# Patient Record
Sex: FEMALE | Race: ASIAN | URBAN - METROPOLITAN AREA
[De-identification: names, ages, dates, MRNs, and addresses within clinical notes are randomized per-mention and may not be internally consistent; named-entity substitution may affect disease eponyms.]

---

## 2018-03-06 ENCOUNTER — THERAPY VISIT (OUTPATIENT)
Dept: PHYSICAL THERAPY | Facility: CLINIC | Age: 28
End: 2018-03-06
Payer: COMMERCIAL

## 2018-03-06 DIAGNOSIS — M25.512 SHOULDER PAIN, LEFT: Primary | ICD-10-CM

## 2018-03-06 PROCEDURE — 97161 PT EVAL LOW COMPLEX 20 MIN: CPT | Mod: GP | Performed by: PHYSICAL THERAPIST

## 2018-03-06 PROCEDURE — 97110 THERAPEUTIC EXERCISES: CPT | Mod: GP | Performed by: PHYSICAL THERAPIST

## 2018-03-06 NOTE — LETTER
[facility] PHYSICAL THERAPY  [address]  [facility] [address]  [phone]    2018    Re: Ashlie Montoya   :   1990  MRN:  [id number]   REFERRING PHYSICIAN:   Elaina Mack    [facility] PHYSICAL THERAPY  Date of Initial Evaluation:  3/6/18  Visits:  Rxs Used: 1  Reason for Referral:  Shoulder pain, left    EVALUATION SUMMARY    [place] [facility] Initial Evaluation  Subjective:  Patient is a 27 year old female presenting with rehab left shoulder hpi. The history is provided by the patient. The history is limited by a language barrier. A  was used.     Ashlie Montoya is a 27 year old female with a left shoulder condition.  Condition occurred with:  Unknown cause.  Condition occurred: for unknown reasons.  This is a new condition  Early 2017 (about 4 weeks).      Patient reports pain:  Anterior and in the joint.  Radiates to:  Cervical and shoulder (up into the neck).  Pain is described as aching (A deep aching) and is intermittent (Occurs with movement) and reported as 2/10.  Associated with: Some clicking and popping, particularly with IR. Pain is the same all the time.  Symptoms are exacerbated by using arm behind back, using arm overhead and using arm at shoulder level and relieved by rest and analgesics.  Since onset symptoms are gradually improving.  Special tests:  X-ray (Patient reports that her x-ray report came back normal).  Previous treatment: N/A.  Improvement with previous treatment: N/A.      General health as reported by patient is good.  Pertinent medical history includes:  None (None reported by patient).  Medical allergies: no (None reported by patient).  Other surgeries include:  None reported.  Current medications:  None as reported by the patient.  Current occupation is .  Patient is working in normal job without restrictions.  Primary job tasks include:  Prolonged sitting and  repetitive tasks.    Barriers include:  None as reported by the patient.  Red flags:  None as reported by the patient.                  Objective:    Shoulder Evaluation:  ROM:  AROM:    Flexion:  Left:  113    Right:  167  Extension: Left: 43Right: 54  Abduction:  Left: 71   Right:  159  Internal Rotation:  Left:  L1    Right:  T9  External Rotation:  Left:  85    Right:  76    Pain: Pain with AROM Flexion and ABD on L side    Strength:    Flexion: Left:3+/5    Pain: +    Right: 4+/5     Pain:   Extension:  Left: 4/5    Pain:    Right: 5/5    Pain:  Abduction:  Left: 3+/5   Pain:+    Right: 4+/5     Pain:    Internal Rotation:  Left:4+/5     Pain:    Right: 5/5     Pain:  External Rotation:   Left:4/5     Pain:   Right:4+/5     Pain:      Assessment/Plan:    Patient is a 27 year old female with left side shoulder complaints.    Patient has the following significant findings with corresponding treatment plan.                  Diagnosis 1:  L Shoulder  Pain -  hot/cold therapy, electric stimulation, manual therapy, self management, education and directional preference exercise  Decreased ROM/flexibility - manual therapy and therapeutic exercise  Decreased strength - therapeutic exercise and therapeutic activities  Impaired muscle performance - neuro re-education  Decreased function - therapeutic activities    Therapy Evaluation Codes:   1) History comprised of:   Personal factors that impact the plan of care:      Language.    Comorbidity factors that impact the plan of care are:      None.     Medications impacting care: None.  2) Examination of Body Systems comprised of:   Body structures and functions that impact the plan of care:      Shoulder.   Activity limitations that impact the plan of care are:      None.  3) Clinical presentation characteristics are:   Stable/Uncomplicated.  4) Decision-Making    Low complexity using standardized patient assessment instrument and/or measureable assessment of functional  outcome.  Cumulative Therapy Evaluation is: Low complexity.    Previous and current functional limitations:  (See Goal Flow Sheet for this information)    Short term and Long term goals: (See Goal Flow Sheet for this information)     Communication ability:  Patient appears to be able to clearly communicate and understand verbal and written communication and follow directions correctly.    Treatment Explanation - The following has been discussed with the patient:   RX ordered/plan of care  Anticipated outcomes  Possible risks and side effects    This patient would benefit from PT intervention to resume normal activities.   Rehab potential is good.  Frequency:  1 X week, once daily  Duration:  for 8 weeks  Discharge Plan:  Achieve all LTG.  Independent in home treatment program.  Reach maximal therapeutic benefit.    Thank you for your referral.    INQUIRIES  Therapist: Yaneli Galloway DPT, Cert. MDT  [facility] PHYSICAL THERAPY  [address]  [facility] [address]  Phone: [phone]  Fax: [phone]

## 2018-03-06 NOTE — MR AVS SNAPSHOT
"              After Visit Summary   3/6/2018    Ashlie Montoya    MRN: [id number]           Patient Information     Date Of Birth          1990        Visit Information        Provider Department      3/6/2018 1:55 PM Yaneli Galloway, PT; [employer] [facility]        Today's Diagnoses     Shoulder pain, left    -  1       Follow-ups after your visit        Your next 10 appointments already scheduled     Mar 13, 2018  2:50 PM CDT   DIANE Extremity with Rosalba Landaverdeo, PTA   [facility] ([facility]  )    [address]  [facility] [id number] [phone]            Mar 20, 2018  2:50 PM CDT   DIANE Extremity with Rosalba Lynn, PTA   [facility] ([facility]  )    [address]  [facility] [id number] [phone]              Who to contact     If you have questions or need follow up information about today's clinic visit or your schedule please contact [facility] PHYSICAL THERAPY directly at [phone].  Normal or non-critical lab and imaging results will be communicated to you by MyChart, letter or phone within 4 business days after the clinic has received the results. If you do not hear from us within 7 days, please contact the clinic through [employer] or phone. If you have a critical or abnormal lab result, we will notify you by phone as soon as possible.  Submit refill requests through [employer] or call your pharmacy and they will forward the refill request to us. Please allow 3 business days for your refill to be completed.          Additional Information About Your Visit        MyChart Information     [employer] lets you send messages to your doctor, view your test results, renew your prescriptions, schedule appointments and more. To sign up, go to [email or web address] . Click on \"Log in\" on the left side of the screen, which will take you to the " "Welcome page. Then click on \"Sign up Now\" on the right side of the page.     You will be asked to enter the access code listed below, as well as some personal information. Please follow the directions to create your username and password.     Your access code is: [email or web address]  Expires: 2018  3:26 PM     Your access code will  in 90 days. If you need help or a new code, please call your [place] clinic or [phone].        Care EveryWhere ID     This is your Care EveryWhere ID. This could be used by other organizations to access your [place] medical records  [id number]         Blood Pressure from Last 3 Encounters:   No data found for BP    Weight from Last 3 Encounters:   No data found for Wt              We Performed the Following     HC PT EVAL, LOW COMPLEXITY     DIANE INITIAL EVAL REPORT     THERAPEUTIC EXERCISES        Primary Care Provider Fax #    Physician No Ref-Primary [phone]       No address on file        Equal Access to Services     FRANK PAYNE : Hadii tiffani ku hadasho Soomaali, waaxda luqadaha, qaybta kaalmada adeegyada, waxay octavioin hayveronica hobson . So [facility] [phone].    ATENCIÓN: Si habla español, tiene a alejo disposición servicios gratuitos de asistencia lingüística. Llame al [phone].    We comply with applicable federal civil rights laws and [place] laws. We do not discriminate on the basis of race, color, national origin, age, disability, sex, sexual orientation, or gender identity.            Thank you!     Thank you for choosing [facility] PHYSICAL THERAPY  for your care. Our goal is always to provide you with excellent care. Hearing back from our patients is one way we can continue to improve our services. Please take a few minutes to complete the written survey that you may receive in the mail after your visit with us. Thank you!             Your Updated Medication List - Protect others around you: Learn how to safely use, store and " throw away your medicines at www.disposemymeds.org.      Notice  As of 3/6/2018  3:26 PM    You have not been prescribed any medications.

## 2018-03-06 NOTE — PROGRESS NOTES
[place] [facility] Initial Evaluation  Subjective:  Patient is a 27 year old female presenting with rehab left shoulder hpi. The history is provided by the patient. The history is limited by a language barrier. A  was used.   Ashlie Montoya is a 27 year old female with a left shoulder condition.  Condition occurred with:  Unknown cause.  Condition occurred: for unknown reasons.  This is a new condition  Early February 2017 (about 4 weeks).    Patient reports pain:  Anterior and in the joint.  Radiates to:  Cervical and shoulder (up into the neck).  Pain is described as aching (A deep aching) and is intermittent (Occurs with movement) and reported as 2/10.  Associated with: Some clicking and popping, particularly with IR. Pain is the same all the time.  Symptoms are exacerbated by using arm behind back, using arm overhead and using arm at shoulder level and relieved by rest and analgesics.  Since onset symptoms are gradually improving.  Special tests:  X-ray (Patient reports that her x-ray report came back normal).  Previous treatment: N/A.  Improvement with previous treatment: N/A.  General health as reported by patient is good.  Pertinent medical history includes:  None (None reported by patient).  Medical allergies: no (None reported by patient).  Other surgeries include:  None reported.  Current medications:  None as reported by the patient.  Current occupation is .  Patient is working in normal job without restrictions.  Primary job tasks include:  Prolonged sitting and repetitive tasks.    Barriers include:  None as reported by the patient.    Red flags:  None as reported by the patient.                        Objective:  System                   Shoulder Evaluation:  ROM:  AROM:    Flexion:  Left:  113    Right:  167  Extension: Left: 43Right: 54  Abduction:  Left: 71   Right:  159    Internal Rotation:  Left:  L1    Right:  T9  External Rotation:  Left:  85     Right:  76                  Pain: Pain with AROM Flexion and ABD on L side    Strength:    Flexion: Left:3+/5    Pain: +    Right: 4+/5     Pain:   Extension:  Left: 4/5    Pain:    Right: 5/5    Pain:  Abduction:  Left: 3+/5   Pain:+    Right: 4+/5     Pain:    Internal Rotation:  Left:4+/5     Pain:    Right: 5/5     Pain:  External Rotation:   Left:4/5     Pain:   Right:4+/5     Pain:                                                     General     ROS    Assessment/Plan:    Patient is a 27 year old female with left side shoulder complaints.    Patient has the following significant findings with corresponding treatment plan.                Diagnosis 1:  L Shoulder  Pain -  hot/cold therapy, electric stimulation, manual therapy, self management, education and directional preference exercise  Decreased ROM/flexibility - manual therapy and therapeutic exercise  Decreased strength - therapeutic exercise and therapeutic activities  Impaired muscle performance - neuro re-education  Decreased function - therapeutic activities    Therapy Evaluation Codes:   1) History comprised of:   Personal factors that impact the plan of care:      Language.    Comorbidity factors that impact the plan of care are:      None.     Medications impacting care: None.  2) Examination of Body Systems comprised of:   Body structures and functions that impact the plan of care:      Shoulder.   Activity limitations that impact the plan of care are:      None.  3) Clinical presentation characteristics are:   Stable/Uncomplicated.  4) Decision-Making    Low complexity using standardized patient assessment instrument and/or measureable assessment of functional outcome.  Cumulative Therapy Evaluation is: Low complexity.    Previous and current functional limitations:  (See Goal Flow Sheet for this information)    Short term and Long term goals: (See Goal Flow Sheet for this information)     Communication ability:  Patient appears to be able to  clearly communicate and understand verbal and written communication and follow directions correctly.  Treatment Explanation - The following has been discussed with the patient:   RX ordered/plan of care  Anticipated outcomes  Possible risks and side effects  This patient would benefit from PT intervention to resume normal activities.   Rehab potential is good.    Frequency:  1 X week, once daily  Duration:  for 8 weeks  Discharge Plan:  Achieve all LTG.  Independent in home treatment program.  Reach maximal therapeutic benefit.    Please refer to the daily flowsheet for treatment today, total treatment time and time spent performing 1:1 timed codes.

## 2018-03-13 ENCOUNTER — THERAPY VISIT (OUTPATIENT)
Dept: PHYSICAL THERAPY | Facility: CLINIC | Age: 28
End: 2018-03-13
Payer: COMMERCIAL

## 2018-03-13 DIAGNOSIS — M25.512 SHOULDER PAIN, LEFT: ICD-10-CM

## 2018-03-13 PROCEDURE — 97112 NEUROMUSCULAR REEDUCATION: CPT | Mod: GP | Performed by: PHYSICAL THERAPY ASSISTANT

## 2018-03-13 PROCEDURE — 97110 THERAPEUTIC EXERCISES: CPT | Mod: GP | Performed by: PHYSICAL THERAPY ASSISTANT

## 2018-03-20 ENCOUNTER — THERAPY VISIT (OUTPATIENT)
Dept: PHYSICAL THERAPY | Facility: CLINIC | Age: 28
End: 2018-03-20
Payer: COMMERCIAL

## 2018-03-20 DIAGNOSIS — M25.512 SHOULDER PAIN, LEFT: ICD-10-CM

## 2018-03-20 PROCEDURE — 97110 THERAPEUTIC EXERCISES: CPT | Mod: GP | Performed by: PHYSICAL THERAPY ASSISTANT

## 2018-03-28 ENCOUNTER — THERAPY VISIT (OUTPATIENT)
Dept: PHYSICAL THERAPY | Facility: CLINIC | Age: 28
End: 2018-03-28
Payer: COMMERCIAL

## 2018-03-28 DIAGNOSIS — M25.512 SHOULDER PAIN, LEFT: ICD-10-CM

## 2018-03-28 PROCEDURE — 97110 THERAPEUTIC EXERCISES: CPT | Mod: GP | Performed by: PHYSICAL THERAPY ASSISTANT

## 2018-04-04 ENCOUNTER — THERAPY VISIT (OUTPATIENT)
Dept: PHYSICAL THERAPY | Facility: CLINIC | Age: 28
End: 2018-04-04
Payer: COMMERCIAL

## 2018-04-04 DIAGNOSIS — M25.512 SHOULDER PAIN, LEFT: ICD-10-CM

## 2018-04-04 PROCEDURE — 97110 THERAPEUTIC EXERCISES: CPT | Mod: GP | Performed by: PHYSICAL THERAPY ASSISTANT

## 2018-04-24 ENCOUNTER — THERAPY VISIT (OUTPATIENT)
Dept: PHYSICAL THERAPY | Facility: CLINIC | Age: 28
End: 2018-04-24
Payer: COMMERCIAL

## 2018-04-24 DIAGNOSIS — M25.512 SHOULDER PAIN, LEFT: ICD-10-CM

## 2018-04-24 PROCEDURE — 97110 THERAPEUTIC EXERCISES: CPT | Mod: GP | Performed by: PHYSICAL THERAPY ASSISTANT

## 2018-04-24 NOTE — PROGRESS NOTES
Subjective:  HPI                    Objective:  System    Physical Exam    General     ROS    Assessment/Plan:    PROGRESS  REPORT    Progress reporting period is from 3/6/18 to 4/24/18.       SUBJECTIVE  Subjective changes noted by patient:  Pt reports shoulder is at least 90% better since starting PT. Most difficult remains with abuuction ROM. No issues at work or school. Continuing to work on neck exercises daily and shoulder strengthening eveyr other day.      Current Pain level: 0/10.     Initial Pain level: 3/10.   Changes in function:  Yes (See Goal flowsheet attached for changes in current functional level)  Adverse reaction to treatment or activity: None    OBJECTIVE  Changes noted in objective findings:  Yes, Flexion has improved 55 degrees, abduction has improved 84 degrees, ER has improved 7 degrees, Ext has improved 8 degrees and IR has improved from L1 to T 9/10; Strength has improved from all motions in regards to MMT.     Objective: L shoulder: AROM Flex 168; Abd 155; ER 92; Ext 51; IR T9/10 (better then R); MMT Flex: 4-/5; Abd: 4/5; IR 4-/5; ER 4+/5; Ext 4/5; Spadi has imrpvoed from 42% to 5%     ASSESSMENT/PLAN  Updated problem list and treatment plan: Diagnosis 1:  L shoulder pain  Pain -  self management, education and home program  Decreased ROM/flexibility - therapeutic exercise and home program  Decreased strength - therapeutic exercise and home program  STG/LTGs have been met or progress has been made towards goals:  Yes (See Goal flow sheet completed today.)  Assessment of Progress: The patient's condition is improving.  The patient's condition has potential to improve.  Patient has met short term goals and is progressing towards long term goals.  Self Management Plans:  Patient is independent in a home treatment program.  Patient is independent in self management of symptoms.  I have discussed this patient with primary PT and find that the nature, scope, duration and intensity of the  therapy is appropriate for the medical condition of the patient.  Ashlie continues to require the following intervention to meet STG and LTG's:  PT intervention is no longer required to meet STG/LTG.    Recommendations:  Patient is going to try things on her own at this time. If problems arise will contact PT and/or MD within the next 6 weeks. If we do not hear from pt will formally d/c at that time.     The progress note/discharge summary was written in collaboration with and reviewed by the physical therapist.    Please refer to the daily flowsheet for treatment today, total treatment time and time spent performing 1:1 timed codes.

## 2018-04-24 NOTE — LETTER
[facility] PHYSICAL THERAPY  [address]  [facility] [address]  [phone]    2018    Re: Ashlie Montoya   :   1990  MRN:  [id number]   REFERRING PHYSICIAN:   Elaina Mack    [facility] PHYSICAL THERAPY  Date of Initial Evaluation: 18  Visits:  Rxs Used: 6  Reason for Referral:  Shoulder pain, left     PROGRESS  REPORT  Progress reporting period is from 3/6/18 to 18.       SUBJECTIVE  Subjective changes noted by patient:  Pt reports shoulder is at least 90% better since starting PT. Most difficult remains with abuuction ROM. No issues at work or school. Continuing to work on neck exercises daily and shoulder strengthening eveyr other day.      Current Pain level: 0/10.     Initial Pain level: 3/10.   Changes in function:  Yes (See Goal flowsheet attached for changes in current functional level)  Adverse reaction to treatment or activity: None    OBJECTIVE  Changes noted in objective findings:  Yes, Flexion has improved 55 degrees, abduction has improved 84 degrees, ER has improved 7 degrees, Ext has improved 8 degrees and IR has improved from L1 to T 9/10; Strength has improved from all motions in regards to MMT.     Objective: L shoulder: AROM Flex 168; Abd 155; ER 92; Ext 51; IR T9/10 (better then R); MMT Flex: 4-/5; Abd: 4/5; IR 4-/5; ER 4+/5; Ext 4/5; Spadi has imrpvoed from 42% to 5%     ASSESSMENT/PLAN  Updated problem list and treatment plan:     Diagnosis 1:  L shoulder pain  Pain -  self management, education and home program  Decreased ROM/flexibility - therapeutic exercise and home program  Decreased strength - therapeutic exercise and home program    STG/LTGs have been met or progress has been made towards goals:  Yes (See Goal flow sheet completed today.)    Assessment of Progress: The patient's condition is improving.  The patient's condition has potential to improve.    Patient has met short term goals and is progressing  towards long term goals.    Self Management Plans:  Patient is independent in a home treatment program.  Patient is independent in self management of symptoms.    I have discussed this patient with primary PT and find that the nature, scope, duration and intensity of the therapy is appropriate for the medical condition of the patient.    Ashlie continues to require the following intervention to meet STG and LTG's:  PT intervention is no longer required to meet STG/LTG.    Recommendations:  Patient is going to try things on her own at this time. If problems arise will contact PT and/or MD within the next 6 weeks. If we do not hear from pt will formally d/c at that time.     The progress note/discharge summary was written in collaboration with and reviewed by the physical therapist.    Thank you for your referral.    INQUIRIES  Therapist: Rosalba Lynn PTA for Yaneli Galloway, DPT, Cert. MDT   [facility] PHYSICAL THERAPY  [address]  [facility] [address]  Phone: [phone]  Fax: [phone]

## 2018-04-24 NOTE — MR AVS SNAPSHOT
"              After Visit Summary   2018    Ashlie Montoya    MRN: [id number]           Patient Information     Date Of Birth          1990        Visit Information        Provider Department      2018 1:55 PM Rosalba Lynn PTA; [employer] [facility] Physical Therapy        Today's Diagnoses     Shoulder pain, left           Follow-ups after your visit        Who to contact     If you have questions or need follow up information about today's clinic visit or your schedule please contact [facility] PHYSICAL THERAPY directly at [phone].  Normal or non-critical lab and imaging results will be communicated to you by [employer], letter or phone within 4 business days after the clinic has received the results. If you do not hear from us within 7 days, please contact the clinic through [employer] or phone. If you have a critical or abnormal lab result, we will notify you by phone as soon as possible.  Submit refill requests through [employer] or call your pharmacy and they will forward the refill request to us. Please allow 3 business days for your refill to be completed.          Additional Information About Your Visit        MyChart Information     [employer] lets you send messages to your doctor, view your test results, renew your prescriptions, schedule appointments and more. To sign up, go to [email or web address] . Click on \"Log in\" on the left side of the screen, which will take you to the Welcome page. Then click on \"Sign up Now\" on the right side of the page.     You will be asked to enter the access code listed below, as well as some personal information. Please follow the directions to create your username and password.     Your access code is: [email or web address]  Expires: 2018  4:26 PM     Your access code will  in 90 days. If you need help or a new code, please call your [place] clinic or [phone].        Care EveryWhere ID     This is " your Care EveryWhere ID. This could be used by other organizations to access your [place] medical records  [id number]         Blood Pressure from Last 3 Encounters:   No data found for BP    Weight from Last 3 Encounters:   No data found for Wt              We Performed the Following     [facility] PROGRESS NOTES REPORT     THERAPEUTIC EXERCISES        Primary Care Provider Fax #    Physician No Ref-Primary [phone]       No address on file        Equal Access to Services     [facility]: Hadii aad ku hadasho Soomaali, waaxda luqadaha, qaybta kaalmada adeegyada, waxay idiin hayaan adeeg khkarysh lalevyn . So [facility] [phone].    ATENCIÓN: Si habla español, tiene a alejo disposición servicios gratuitos de asistencia lingüística. Llame al [phone].    We comply with applicable federal civil rights laws and [place] laws. We do not discriminate on the basis of race, color, national origin, age, disability, sex, sexual orientation, or gender identity.            Thank you!     Thank you for choosing [facility] PHYSICAL THERAPY  for your care. Our goal is always to provide you with excellent care. Hearing back from our patients is one way we can continue to improve our services. Please take a few minutes to complete the written survey that you may receive in the mail after your visit with us. Thank you!             Your Updated Medication List - Protect others around you: Learn how to safely use, store and throw away your medicines at www.disposemymeds.org.      Notice  As of 4/24/2018  2:49 PM    You have not been prescribed any medications.